# Patient Record
Sex: MALE | Race: WHITE
[De-identification: names, ages, dates, MRNs, and addresses within clinical notes are randomized per-mention and may not be internally consistent; named-entity substitution may affect disease eponyms.]

---

## 2017-05-19 ENCOUNTER — HOSPITAL ENCOUNTER (EMERGENCY)
Dept: HOSPITAL 25 - UCCORT | Age: 47
Discharge: HOME | End: 2017-05-19
Payer: COMMERCIAL

## 2017-05-19 VITALS — DIASTOLIC BLOOD PRESSURE: 81 MMHG | SYSTOLIC BLOOD PRESSURE: 130 MMHG
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
## 2017-05-19 DIAGNOSIS — J45.909: ICD-10-CM

## 2017-05-19 DIAGNOSIS — R05: ICD-10-CM

## 2017-05-19 DIAGNOSIS — F17.210: ICD-10-CM

## 2017-05-19 DIAGNOSIS — S49.91XA: Primary | ICD-10-CM
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
## 2017-05-19 PROCEDURE — 71020: CPT

## 2017-05-19 PROCEDURE — G0463 HOSPITAL OUTPT CLINIC VISIT: HCPCS

## 2017-05-19 PROCEDURE — 99203 OFFICE O/P NEW LOW 30 MIN: CPT

## 2017-05-19 NOTE — RAD
INDICATION: Blood-tinged sputum. History of asthma and environmental allergies. Multiple

days productive cough.



COMPARISON: None.



TECHNIQUE:  Dual energy PA and routine lateral views of the chest were obtained.



REPORT:  Clear lungs and pleural spaces. Negative for pneumothorax. The heart, pulmonary

vasculature, and mediastinal contours are unremarkable. Unremarkable osseous structures

and soft tissue contours.



IMPRESSION: No evidence for acute intrathoracic disease.

## 2017-05-19 NOTE — UC
Shoulder Pain HPI





- HPI Summary


HPI Summary: 





47 yo male with forced hyperextension of his right shoulder falling out of 18 

[employer] cab








also productive cough x 4 days now blood tinged














- History of Current Complaint


Chief Complaint: UCGeneralIllness


Stated Complaint: RIGHT SHOULDER PAIN


Time Seen by Provider: 05/19/17 12:02


Hx Obtained From: Patient


Onset/Duration: Sudden Onset


Timing: Constant


Severity Initially: Moderate


Severity Currently: Moderate


Location Of Pain: Is Diffuse


Pain Intensity: 4


Pain Scale Used: 0-10 Numeric


Character: Dull, Aching


Aggravating Factor(s): Movement, Abduction


Alleviating Factor(s): Rest


Related History: Dominant Hand Right





- Allergies/Home Medications


Allergies/Adverse Reactions: 


 Allergies











Allergy/AdvReac Type Severity Reaction Status Date / Time


 


environmental Allergy  Eyes Uncoded 05/19/17 10:50





   Itchy/Swollen/Red/Watery  











Home Medications: 


 Home Medications





Albuterol HFA INHALER* [Ventolin HFA Inhaler*] 1 - 2 puff INH Q6H PRN 05/19/17 [

History Confirmed 05/19/17]


Budesonide/Formote 160/4.5(NF) [Symbicort 160/4.5 (NF)] 2 puff INH BID 05/19/17 

[History Confirmed 05/19/17]


Cetirizine* [ZyrTEC 10 MG TAB*] 10 mg PO DAILY 05/19/17 [History Confirmed 05/19 /17]











PMH/Surg Hx/FS Hx/Imm Hx


Previously Healthy: Yes


Respiratory History Of: Reports: Asthma





- Surgical History


Surgical History: Yes


Surgery Procedure, Year, and Place: eye surgery, right arm w/ hardware, hernia





- Family History


Known Family History: Positive: Hypertension





- Social History


Alcohol Use: Rare


Substance Use Type: None


Smoking Status (MU): Current Every Day Smoker


Type: Cigarettes


Amount Used/How Often: 1 pack a week


Length of Time of Smoking/Using Tobacco: 1yr





Review of Systems


Constitutional: Negative


Skin: Negative


Eyes: Negative


ENT: Negative


Respiratory: Cough


Cardiovascular: Negative


Gastrointestinal: Negative


Genitourinary: Negative


Motor: Negative


Neurovascular: Negative


Musculoskeletal: Arthralgia


Neurological: Negative


Psychological: Negative


All Other Systems Reviewed And Are Negative: Yes





Physical Exam


Triage Information Reviewed: Yes


Appearance: Well-Appearing, No Pain Distress, Well-Nourished


Vital Signs: 


 Initial Vital Signs











Temp  98.4 F   05/19/17 10:53


 


Pulse  90   05/19/17 10:53


 


Resp  16   05/19/17 10:53


 


BP  130/81   05/19/17 10:53


 


Pulse Ox  98   05/19/17 10:53











Vital Signs Reviewed: Yes


Eyes: Positive: Conjunctiva Clear


ENT: Positive: Normal ENT inspection


Dental Exam: Normal


Neck: Positive: Supple


Respiratory: Positive: Lungs clear, Normal breath sounds, No respiratory 

distress


Cardiovascular: Positive: RRR, No Murmur


Musculoskeletal: Positive: ROM Intact, No Edema


Neurological: Positive: Alert


Psychological Exam: Normal


Skin Exam: Normal





Shoulder Course/Dx





- Differential Dx/Diagnosis


Provider Diagnoses: right shoulder injury, ?partial rotator cuff tear





Discharge





- Discharge Plan


Condition: Stable


Disposition: HOME


Prescriptions: 


Amoxicillin (*) [Amoxicillin 875 MG (*)] 875 mg PO BID #20 tab


Naproxen Sodium [Naproxen Sodium 500 MG TAB] 500 mg PO BID PRN #30 tab


 PRN Reason: Pain


Patient Education Materials:  Acute Bronchitis (ED), Shoulder Pain (ED)


Forms:  *Work Release


Referrals: 


Nick Brewer MD [Medical Doctor] -  (get checked next week)


Additional Instructions: 


sling as needed for comfort


ice twice daily





I am concerned you may have a partially torn right rotator cuff

## 2017-05-19 NOTE — RAD
INDICATION: Right shoulder injury     



COMPARISON: None



TECHNIQUE: Routine frontal and Y views were obtained.



FINDINGS: The bony structures, joint spaces, and soft tissues are normal for age.



IMPRESSION: NO ACUTE BONY FINDINGS.

## 2018-05-17 ENCOUNTER — HOSPITAL ENCOUNTER (EMERGENCY)
Dept: HOSPITAL 25 - UCCORT | Age: 48
Discharge: HOME | End: 2018-05-17
Payer: COMMERCIAL

## 2018-05-17 VITALS — DIASTOLIC BLOOD PRESSURE: 79 MMHG | SYSTOLIC BLOOD PRESSURE: 133 MMHG

## 2018-05-17 DIAGNOSIS — J32.9: Primary | ICD-10-CM

## 2018-05-17 DIAGNOSIS — F17.210: ICD-10-CM

## 2018-05-17 PROCEDURE — 99212 OFFICE O/P EST SF 10 MIN: CPT

## 2018-05-17 PROCEDURE — G0463 HOSPITAL OUTPT CLINIC VISIT: HCPCS

## 2018-05-17 NOTE — UC
Throat Pain/Nasal [place] HPI





- HPI Summary


HPI Summary: 





Pt presents with c/o nasal congestion, HA, sinus pressure X 10 days. 





- History of Current Complaint


Chief Complaint: UCGeneralIllness


Stated Complaint: LOSS OF VOICE,COUGH,SINUSES (HAS ASTHMA)


Time Seen by Provider: 05/17/18 13:38


Hx Obtained From: Patient


Onset/Duration: Gradual Onset, Lasting Days, Still Present


Severity: Moderate


Pain Intensity: 0


Cough: Nonproductive


Associated Signs & Symptoms: Positive: Sinus Discomfort


Related History: Seasonal Allergies





- Epiglottits Risk Factors


Epiglottis Risk Factors: Negative





- Allergies/Home Medications


Allergies/Adverse Reactions: 


 Allergies











Allergy/AdvReac Type Severity Reaction Status Date / Time


 


environmental Allergy  Eyes Uncoded 05/17/18 13:39





   Itchy/Swollen/Red/Watery  











Home Medications: 


 Home Medications





Fluticasone NASAL SPRAY 50MCG* [Flonase NASAL SPRAY 50MCG*] 2 spray BOTH NARES 

DAILY 05/17/18 [History Confirmed 05/17/18]











PMH/Surg Hx/FS Hx/Imm Hx


Previously Healthy: Yes





- Surgical History


Surgical History: Yes


Surgery Procedure, Year, and Place: LEFT eye surgery AGE 9 STITCH MUSCLE BACK 

TOGETHER, right arm w/ hardware, hernia





- Family History


Known Family History: Positive: Hypertension





- Social History


Occupation: Employed Full-time


Lives: With Family


Alcohol Use: Rare


Substance Use Type: None


Smoking Status (MU): Current Every Day Smoker


Type: Cigarettes


Amount Used/How Often: 1/2 pack a week


Length of Time of Smoking/Using Tobacco: 1yr


Have You Smoked in the Last Year: Yes





Review of Systems


Constitutional: Fatigue


Skin: Negative


Eyes: Negative


ENT: Sinus Congestion, Sinus Pain/Tenderness


Respiratory: Negative


Cardiovascular: Negative


Gastrointestinal: Negative


Genitourinary: Negative


Motor: Negative


Neurovascular: Negative


Musculoskeletal: Negative


Neurological: Headache


Psychological: Negative


Is Patient Immunocompromised?: No


All Other Systems Reviewed And Are Negative: Yes





Physical Exam


Triage Information Reviewed: Yes


Appearance: Ill-Appearing


Vital Signs: 


 Initial Vital Signs











Temp  99.3 F   05/17/18 13:36


 


Pulse  107   05/17/18 13:36


 


Resp  18   05/17/18 13:36


 


BP  133/79   05/17/18 13:36


 


Pulse Ox  99   05/17/18 13:36











Vital Signs Reviewed: Yes


Eye Exam: Normal


ENT Exam: Other


ENT: Positive: Nasal congestion, Sinus tenderness


Dental Exam: Normal


Neck exam: Normal


Respiratory Exam: Normal


Respiratory: Positive: No respiratory distress


Cardiovascular Exam: Normal


Musculoskeletal Exam: Normal


Neurological Exam: Normal


Psychological Exam: Normal


Skin Exam: Normal





Throat Pain/Nasal Course/Dx





- Differential Dx/Diagnosis


Differential Diagnosis/HQI/PQRI: Sinusitis, URI


Provider Diagnoses: sinusitis





Discharge





- Sign-Out/Discharge


Documenting (check all that apply): Discharge/Admit/Transfer





- Discharge Plan


Condition: Stable


Disposition: HOME


Prescriptions: 


Amoxicillin PO (*) [Amoxicillin 875 MG (*)] 875 mg PO Q12H #20 tab


Pseudoephedrine TAB* [Sudafed TAB*] 60 mg PO DAILY #10 tab


Patient Education Materials:  Sinusitis (ED)


Referrals: 


Wally Valverde MD [Primary Care Provider] - If Needed





- Billing Disposition and Condition


Condition: STABLE


Disposition: HOME

## 2019-08-19 ENCOUNTER — HOSPITAL ENCOUNTER (EMERGENCY)
Dept: HOSPITAL 25 - UCCORT | Age: 49
Discharge: ANOTHER HOSPITAL | End: 2019-08-19
Payer: COMMERCIAL

## 2019-08-19 VITALS — DIASTOLIC BLOOD PRESSURE: 84 MMHG | SYSTOLIC BLOOD PRESSURE: 130 MMHG

## 2019-08-19 DIAGNOSIS — R10.31: Primary | ICD-10-CM

## 2019-08-19 DIAGNOSIS — F17.210: ICD-10-CM

## 2019-08-19 PROCEDURE — 99212 OFFICE O/P EST SF 10 MIN: CPT

## 2019-08-19 PROCEDURE — G0463 HOSPITAL OUTPT CLINIC VISIT: HCPCS

## 2019-08-19 NOTE — UC
UC General HPI





- HPI Summary


HPI Summary: 





WORSENING LOWER ABDOMINAL PAIN, BLOATING, NAUSEA AND DIARRHEA SINCE YESTERDAY.


HX DIVERTICULITIS, DX 3 YEARS AGO.


C/O "RUSHING AND CRAMPING" IN HIS ABDOMEN.





- History of Current Complaint


Chief Complaint: UCAbdominalPain


Stated Complaint: ABDOMINAL PAIN


Time Seen by Provider: 08/19/19 09:50


Hx Obtained From: Patient


Onset/Duration: Gradual Onset


Timing: Constant


Pain Intensity: 9


Aggravating: MOVEMENT WORSENS PAIN


Associated Signs & Symptoms: Positive: Abdominal Pain, Diarrhea, Nausea.  

Negative: Fever





- Allergy/Home Medications


Allergies/Adverse Reactions: 


 Allergies











Allergy/AdvReac Type Severity Reaction Status Date / Time


 


environmental Allergy  Eyes Uncoded 08/19/19 09:46





   Itchy/Swollen/Red/Watery  











Home Medications: 


 Home Medications





ValACYclovir (*) [Valtrex 1 GM(*)] 1 gm PO DAILY 08/19/19 [History Confirmed 08/ 19/19]


predniSONE TAB* [Deltasone 10 MG TAB*] 10 mg PO DAILY 08/19/19 [History 

Confirmed 08/19/19]











PMH/Surg Hx/FS Hx/Imm Hx





- Additional Past Medical History


Additional PMH: 





Fyffe Palsy


Respiratory History: Asthma


GI/ History: Diverticulitis





- Surgical History


Surgical History: Yes


Surgery Procedure, Year, and Place: LEFT eye surgery AGE 9 STITCH MUSCLE BACK 

TOGETHER, right arm w/ hardware, hernia





- Family History


Known Family History: Positive: Hypertension





- Social History


Occupation: Employed Full-time


Alcohol Use: Rare


Substance Use Type: None


Smoking Status (MU): Current Every Day Smoker


Type: Cigarettes


Amount Used/How Often: 4 cigarettes per day


Length of Time of Smoking/Using Tobacco: 1yr


Have You Smoked in the Last Year: Yes





Review of Systems


All Other Systems Reviewed And Are Negative: Yes


Constitutional: Negative: Fever, Chills


Gastrointestinal: Positive: Abdominal Pain, Diarrhea, Nausea


Genitourinary: Positive: Other - no testicular pain.  Negative: Dysuria





Physical Exam


Triage Information Reviewed: Yes


Appearance: Well-Appearing


Vital Signs: 


 Initial Vital Signs











Temp  97.7 F   08/19/19 09:31


 


Pulse  87   08/19/19 09:31


 


Resp  16   08/19/19 09:31


 


BP  130/84   08/19/19 09:31


 


Pulse Ox  96   08/19/19 09:31











Vital Signs Reviewed: Yes


Eyes: Positive: Conjunctiva Clear, Other: - L lid droopy(Fyffe palsy). no acute 

change.


ENT: Positive: Pharynx normal.  Negative: Nasal drainage


Neck: Positive: Supple, Nontender, No Lymphadenopathy


Respiratory: Positive: Lungs clear, Normal breath sounds, No respiratory 

distress


Cardiovascular: Positive: RRR, No Murmur


Abdomen Description: Positive: Other: - Mild distension. Hypoactive BS. Soft. 

Tender with guarding RLQ. No mass or HSM. No CVA tenderness.


Male Genital Exam: Positive: Other - Defer to ER


Musculoskeletal: Positive: ROM Intact


Neurological: Positive: Alert


Psychological: Positive: Age Appropriate Behavior


Skin Exam: Normal





Course/Dx





- Course


Course Of Treatment: 





REPORT CALLED TO PAMELA DE LA O NP AT [facility].


I ADVISED OF ABDOMINAL PAIN, BLOATING, N/D AND RLQ PAIN.


CONCERN FOR APPENDICITIS VS DIVERTICULITIS.





pt refused ems transfer.


wants to drive self to ER.





- Differential Dx - Multi-Symptom


Differential Diagnoses: Other - concern for appendicitis vs diverticulitis. no 

concern for  source.





- Diagnoses


Provider Diagnosis: 


 Abdominal pain








Discharge





- Sign-Out/Discharge


Documenting (check all that apply): Patient Departure


All imaging exams completed and their final reports reviewed: No Studies





- Discharge Plan


Condition: Stable


Disposition: TRANS HIGHER LVL OF CARE FAC


Referrals: 


Wally Valverde MD [Primary Care Provider] - 


Additional Instructions: 


LEAVE HERE AND GO DIRECTLY TO THE [facility] AS DISCUSSED





- Billing Disposition and Condition


Condition: STABLE


Disposition: Trans Higher Lvl of Care Fac